# Patient Record
Sex: FEMALE | Race: WHITE | URBAN - METROPOLITAN AREA
[De-identification: names, ages, dates, MRNs, and addresses within clinical notes are randomized per-mention and may not be internally consistent; named-entity substitution may affect disease eponyms.]

---

## 2024-03-08 ENCOUNTER — OFFICE VISIT (OUTPATIENT)
Dept: INTERNAL MEDICINE CLINIC | Facility: CLINIC | Age: 44
End: 2024-03-08
Payer: COMMERCIAL

## 2024-03-08 VITALS
BODY MASS INDEX: 41.41 KG/M2 | WEIGHT: 225 LBS | HEIGHT: 62 IN | RESPIRATION RATE: 14 BRPM | OXYGEN SATURATION: 99 % | HEART RATE: 104 BPM | DIASTOLIC BLOOD PRESSURE: 86 MMHG | TEMPERATURE: 99 F | SYSTOLIC BLOOD PRESSURE: 126 MMHG

## 2024-03-08 DIAGNOSIS — H81.10 BENIGN PAROXYSMAL POSITIONAL VERTIGO, UNSPECIFIED LATERALITY: ICD-10-CM

## 2024-03-08 DIAGNOSIS — Z12.31 ENCOUNTER FOR SCREENING MAMMOGRAM FOR MALIGNANT NEOPLASM OF BREAST: ICD-10-CM

## 2024-03-08 DIAGNOSIS — Z13.220 SCREENING, LIPID: ICD-10-CM

## 2024-03-08 DIAGNOSIS — M54.50 ACUTE MIDLINE LOW BACK PAIN WITHOUT SCIATICA: ICD-10-CM

## 2024-03-08 DIAGNOSIS — R21 RASH: Primary | ICD-10-CM

## 2024-03-08 DIAGNOSIS — Z13.29 SCREENING FOR THYROID DISORDER: ICD-10-CM

## 2024-03-08 DIAGNOSIS — Z13.1 SCREENING FOR DIABETES MELLITUS (DM): ICD-10-CM

## 2024-03-08 DIAGNOSIS — Z12.11 SCREEN FOR COLON CANCER: ICD-10-CM

## 2024-03-08 DIAGNOSIS — Z13.0 SCREENING, ANEMIA, DEFICIENCY, IRON: ICD-10-CM

## 2024-03-08 PROCEDURE — 3008F BODY MASS INDEX DOCD: CPT | Performed by: PHYSICIAN ASSISTANT

## 2024-03-08 PROCEDURE — 99204 OFFICE O/P NEW MOD 45 MIN: CPT | Performed by: PHYSICIAN ASSISTANT

## 2024-03-08 PROCEDURE — 3079F DIAST BP 80-89 MM HG: CPT | Performed by: PHYSICIAN ASSISTANT

## 2024-03-08 PROCEDURE — 90715 TDAP VACCINE 7 YRS/> IM: CPT | Performed by: PHYSICIAN ASSISTANT

## 2024-03-08 PROCEDURE — 3074F SYST BP LT 130 MM HG: CPT | Performed by: PHYSICIAN ASSISTANT

## 2024-03-08 PROCEDURE — 90471 IMMUNIZATION ADMIN: CPT | Performed by: PHYSICIAN ASSISTANT

## 2024-03-08 RX ORDER — FLUTICASONE PROPIONATE 50 MCG
SPRAY, SUSPENSION (ML) NASAL
COMMUNITY
Start: 2024-02-26

## 2024-03-08 RX ORDER — DOXYCYCLINE 100 MG/1
CAPSULE ORAL
COMMUNITY
Start: 2024-02-28

## 2024-03-08 RX ORDER — YOHIMBE BARK 500 MG
CAPSULE ORAL
COMMUNITY

## 2024-03-08 RX ORDER — AZELASTINE HYDROCHLORIDE 137 UG/1
1 SPRAY, METERED NASAL DAILY
COMMUNITY
Start: 2024-02-28

## 2024-03-08 RX ORDER — CHOLECALCIFEROL (VITAMIN D3) 25 MCG
TABLET,CHEWABLE ORAL
COMMUNITY

## 2024-03-08 RX ORDER — METRONIDAZOLE 10 MG/G
GEL TOPICAL
COMMUNITY
Start: 2024-02-26

## 2024-03-08 RX ORDER — MULTIVIT-MIN/FOLIC ACID/BIOTIN 200-300MCG
TABLET,CHEWABLE ORAL
COMMUNITY

## 2024-03-08 NOTE — PROGRESS NOTES
Shelby Bran is a 43 year old female.   Chief Complaint   Patient presents with    New Patient     Establish care - Pt has had recent  bouts of vertigo in the past 2wks.  Pt also c/o lower back/hip pain     HPI:    Pt presents to establish care.     Rash to face since this morning. Rash extends onto neck and chest. +itchy.   She developed similar rash with pcn and sulfa in the past.   Currently taking doxycycline due to sinusitis. She has been on this x 9 days. Sinuses are feeling better.   Denies difficulty swallowing, difficulty breathing, lip swelling, facial swelling, tongue swelling, and sensation of throat closing.     Dizziness. Occurs when laying down and rolls to the right. Intermittent x 2 weeks.   Dizziness resolves quickly.     Low back pain x 1 month. Feels like grinding where spine and pelvis come together.  Seems to come and go randomly.   Denies injury/trauma.   Denies difficulty sleeping due to pain.   Ibuporofen prn with some relief  Denies urinary symptoms.   Denies bowel/bladder incontinence/habit changes.       Allergies:  Allergies   Allergen Reactions    Penicillins HIVES    Sulfa Antibiotics HIVES    Doxycycline RASH      Current Meds:  Current Outpatient Medications   Medication Sig Dispense Refill    Apple Cider Vinegar 600 MG Oral Cap       azelastine 137 MCG/SPRAY Nasal Solution 1 spray by Nasal route daily.      Cholecalciferol (VITAMIN D3 GUMMIES) 25 MCG (1000 UT) Oral Chew Tab       fluticasone propionate 50 MCG/ACT Nasal Suspension       Lactobacillus (ACIDOPHILUS) 100 MG Oral Cap       metroNIDAZOLE 1 % External Gel       Misc Natural Products (ELDERBERRY/VITAMIN C/ZINC) Oral Chew Tab       Multiple Vitamins-Minerals (WOMENS MULTI GUMMIES) Oral Chew Tab       Doxycycline Monohydrate 100 MG Oral Cap           PMH:     Past Medical History:   Diagnosis Date    Allergic rhinitis     Esophageal reflux     Not currently a problem    Essential hypertension     During pregnancy 13 years  ago    Obesity     Sleep apnea     Currently being evaluated for sleep apnea    Tendonitis of both wrists     Tinnitus aurium, right        ROS:   GENERAL: Negative for fever, chills and fatigue. NAD.  HENT: Negative for congestion, sore throat, and ear pain.  RESPIRATORY: Negative for cough, chest tightness, shortness of breath and wheezing.    CV: Negative for chest pain, palpitations and leg swelling.   GI: Negative for nausea, vomiting, abdominal pain, diarrhea, and blood in stool.   : Negative for dysuria, hematuria and difficulty urinating.   MUSCULOSKELETAL: Negative for myalgias, joint swelling, arthralgias and gait problem. +back pain   NEURO: Negative for syncope, weakness, numbness, tingling and headaches. +dizziness  PSYCH: The patient is not nervous/anxious. No depression.      PHYSICAL EXAM:    /86   Pulse 104   Temp 98.7 °F (37.1 °C) (Temporal)   Resp 14   Ht 5' 2\" (1.575 m)   Wt 225 lb (102.1 kg)   LMP 01/08/2024 (Exact Date)   SpO2 99%   BMI 41.15 kg/m²     GENERAL: NAD. A&Ox3  SKIN: erythematous macular rash to left side of face, anterior neck, and chest   EYES: PERRL, EOMI, sclera clear   HEENT: Ear canals clear, TMs pearly gray bilaterally. Throat without erythema or exudates.  NECK: No LAD.   RESPIRATORY: CTAB, no R/R/W  CV: RRR, no murmurs.   ABD: Soft, non-tender, non-distended. +bowel sounds. No rebound tenderness.   NEURO: No focal deficits.   MUSCULOSKELETAL: Full ROM of all extremities. No swelling. +left SI joint tenderness   PSYCH: Appropriate mood and affect.      ASSESSMENT/ PLAN:   1. Rash  Stop doxycycline (med added to allergy list)  Start zyrtec daily   If persists or worsens, then would add po steroid     2. Benign paroxysmal positional vertigo, unspecified laterality  Change positions slowly   Vestibular PT if persists     3. Acute midline low back pain without sciatica  Check xray   - XR LUMBAR SPINE (MIN 2 VIEWS) (CPT=72100); Future    4. Encounter for screening  mammogram for malignant neoplasm of breast  - [facility] EDNA 2D+3D SCREENING BILAT (CPT=77067/42321); Future    5. Screen for colon cancer  - Gastro Referral - In Network    6. Screening for diabetes mellitus (DM)  - Comp Metabolic Panel (14) [E]; Future    7. Screening, lipid  - Lipid Panel [E]; Future    8. Screening, anemia, deficiency, iron  - CBC W Differential W Platelet [E]; Future    9. Screening for thyroid disorder  - TSH W Reflex To Free T4 [E]; Future       Health Maintenance Due   Topic Date Due    Annual Physical  Never done    Colorectal Cancer Screening  Never done    Mammogram  Never done    DTaP,Tdap,and Td Vaccines (1 - Tdap) Never done    Pap Smear  Never done    COVID-19 Vaccine (1 - 2023-24 season) Never done    Influenza Vaccine (1) Never done    Annual Depression Screening  Never done           Pt indicates understanding and agrees to the plan.     Return in about 1 month (around 4/8/2024) for physical.    Jaja Pitts PA-C

## 2024-03-14 ENCOUNTER — LAB ENCOUNTER (OUTPATIENT)
Dept: LAB | Age: 44
End: 2024-03-14
Attending: PHYSICIAN ASSISTANT
Payer: COMMERCIAL

## 2024-03-14 DIAGNOSIS — Z13.0 SCREENING, ANEMIA, DEFICIENCY, IRON: ICD-10-CM

## 2024-03-14 DIAGNOSIS — R73.01 IFG (IMPAIRED FASTING GLUCOSE): ICD-10-CM

## 2024-03-14 DIAGNOSIS — Z13.29 SCREENING FOR THYROID DISORDER: ICD-10-CM

## 2024-03-14 DIAGNOSIS — Z13.220 SCREENING, LIPID: ICD-10-CM

## 2024-03-14 DIAGNOSIS — Z13.1 SCREENING FOR DIABETES MELLITUS (DM): ICD-10-CM

## 2024-03-14 LAB
ALBUMIN SERPL-MCNC: 3.8 G/DL (ref 3.4–5)
ALBUMIN/GLOB SERPL: 1 {RATIO} (ref 1–2)
ALP LIVER SERPL-CCNC: 63 U/L
ALT SERPL-CCNC: 27 U/L
ANION GAP SERPL CALC-SCNC: 6 MMOL/L (ref 0–18)
AST SERPL-CCNC: 23 U/L (ref 15–37)
BASOPHILS # BLD AUTO: 0.05 X10(3) UL (ref 0–0.2)
BASOPHILS NFR BLD AUTO: 0.7 %
BILIRUB SERPL-MCNC: 0.4 MG/DL (ref 0.1–2)
BUN BLD-MCNC: 15 MG/DL (ref 9–23)
CALCIUM BLD-MCNC: 9.3 MG/DL (ref 8.5–10.1)
CHLORIDE SERPL-SCNC: 108 MMOL/L (ref 98–112)
CHOLEST SERPL-MCNC: 172 MG/DL (ref ?–200)
CO2 SERPL-SCNC: 21 MMOL/L (ref 21–32)
CREAT BLD-MCNC: 0.75 MG/DL
EGFRCR SERPLBLD CKD-EPI 2021: 100 ML/MIN/1.73M2 (ref 60–?)
EOSINOPHIL # BLD AUTO: 0.31 X10(3) UL (ref 0–0.7)
EOSINOPHIL NFR BLD AUTO: 4.3 %
ERYTHROCYTE [DISTWIDTH] IN BLOOD BY AUTOMATED COUNT: 13.5 %
FASTING PATIENT LIPID ANSWER: YES
FASTING STATUS PATIENT QL REPORTED: YES
GLOBULIN PLAS-MCNC: 3.7 G/DL (ref 2.8–4.4)
GLUCOSE BLD-MCNC: 109 MG/DL (ref 70–99)
HCT VFR BLD AUTO: 40.1 %
HDLC SERPL-MCNC: 45 MG/DL (ref 40–59)
HGB BLD-MCNC: 13.7 G/DL
IMM GRANULOCYTES # BLD AUTO: 0.01 X10(3) UL (ref 0–1)
IMM GRANULOCYTES NFR BLD: 0.1 %
LDLC SERPL CALC-MCNC: 100 MG/DL (ref ?–100)
LYMPHOCYTES # BLD AUTO: 2.56 X10(3) UL (ref 1–4)
LYMPHOCYTES NFR BLD AUTO: 35.3 %
MCH RBC QN AUTO: 29.9 PG (ref 26–34)
MCHC RBC AUTO-ENTMCNC: 34.2 G/DL (ref 31–37)
MCV RBC AUTO: 87.6 FL
MONOCYTES # BLD AUTO: 0.39 X10(3) UL (ref 0.1–1)
MONOCYTES NFR BLD AUTO: 5.4 %
NEUTROPHILS # BLD AUTO: 3.94 X10 (3) UL (ref 1.5–7.7)
NEUTROPHILS # BLD AUTO: 3.94 X10(3) UL (ref 1.5–7.7)
NEUTROPHILS NFR BLD AUTO: 54.2 %
NONHDLC SERPL-MCNC: 127 MG/DL (ref ?–130)
OSMOLALITY SERPL CALC.SUM OF ELEC: 281 MOSM/KG (ref 275–295)
PLATELET # BLD AUTO: 303 10(3)UL (ref 150–450)
POTASSIUM SERPL-SCNC: 4.3 MMOL/L (ref 3.5–5.1)
PROT SERPL-MCNC: 7.5 G/DL (ref 6.4–8.2)
RBC # BLD AUTO: 4.58 X10(6)UL
SODIUM SERPL-SCNC: 135 MMOL/L (ref 136–145)
TRIGL SERPL-MCNC: 155 MG/DL (ref 30–149)
TSI SER-ACNC: 1.55 MIU/ML (ref 0.36–3.74)
VLDLC SERPL CALC-MCNC: 26 MG/DL (ref 0–30)
WBC # BLD AUTO: 7.3 X10(3) UL (ref 4–11)

## 2024-03-14 PROCEDURE — 36415 COLL VENOUS BLD VENIPUNCTURE: CPT

## 2024-03-14 PROCEDURE — 83036 HEMOGLOBIN GLYCOSYLATED A1C: CPT

## 2024-03-14 PROCEDURE — 85025 COMPLETE CBC W/AUTO DIFF WBC: CPT

## 2024-03-14 PROCEDURE — 80061 LIPID PANEL: CPT

## 2024-03-14 PROCEDURE — 80053 COMPREHEN METABOLIC PANEL: CPT

## 2024-03-14 PROCEDURE — 84443 ASSAY THYROID STIM HORMONE: CPT

## 2024-03-15 ENCOUNTER — HOSPITAL ENCOUNTER (OUTPATIENT)
Dept: GENERAL RADIOLOGY | Age: 44
Discharge: HOME | End: 2024-03-15
Attending: PHYSICIAN ASSISTANT
Payer: COMMERCIAL

## 2024-03-15 DIAGNOSIS — R73.01 IFG (IMPAIRED FASTING GLUCOSE): Primary | ICD-10-CM

## 2024-03-15 DIAGNOSIS — M54.50 ACUTE MIDLINE LOW BACK PAIN WITHOUT SCIATICA: ICD-10-CM

## 2024-03-15 LAB
EST. AVERAGE GLUCOSE BLD GHB EST-MCNC: 134 MG/DL (ref 68–126)
HBA1C MFR BLD: 6.3 % (ref ?–5.7)

## 2024-03-15 PROCEDURE — 72100 X-RAY EXAM L-S SPINE 2/3 VWS: CPT | Performed by: PHYSICIAN ASSISTANT

## 2024-04-09 ENCOUNTER — OFFICE VISIT (OUTPATIENT)
Dept: INTERNAL MEDICINE CLINIC | Facility: CLINIC | Age: 44
End: 2024-04-09
Payer: COMMERCIAL

## 2024-04-09 VITALS
BODY MASS INDEX: 41.96 KG/M2 | DIASTOLIC BLOOD PRESSURE: 76 MMHG | HEART RATE: 79 BPM | RESPIRATION RATE: 14 BRPM | HEIGHT: 62 IN | WEIGHT: 228 LBS | SYSTOLIC BLOOD PRESSURE: 134 MMHG | OXYGEN SATURATION: 97 %

## 2024-04-09 DIAGNOSIS — M54.50 ACUTE MIDLINE LOW BACK PAIN WITHOUT SCIATICA: ICD-10-CM

## 2024-04-09 DIAGNOSIS — Z12.4 SCREENING FOR CERVICAL CANCER: ICD-10-CM

## 2024-04-09 DIAGNOSIS — G47.33 OSA (OBSTRUCTIVE SLEEP APNEA): ICD-10-CM

## 2024-04-09 DIAGNOSIS — R73.03 PREDIABETES: ICD-10-CM

## 2024-04-09 DIAGNOSIS — Z00.00 ROUTINE GENERAL MEDICAL EXAMINATION AT A HEALTH CARE FACILITY: Primary | ICD-10-CM

## 2024-04-09 PROCEDURE — 3008F BODY MASS INDEX DOCD: CPT | Performed by: PHYSICIAN ASSISTANT

## 2024-04-09 PROCEDURE — 3078F DIAST BP <80 MM HG: CPT | Performed by: PHYSICIAN ASSISTANT

## 2024-04-09 PROCEDURE — 99396 PREV VISIT EST AGE 40-64: CPT | Performed by: PHYSICIAN ASSISTANT

## 2024-04-09 PROCEDURE — 87624 HPV HI-RISK TYP POOLED RSLT: CPT | Performed by: PHYSICIAN ASSISTANT

## 2024-04-09 PROCEDURE — 3075F SYST BP GE 130 - 139MM HG: CPT | Performed by: PHYSICIAN ASSISTANT

## 2024-04-09 NOTE — PROGRESS NOTES
Shelby Bran is a 43 year old female.    Chief Complaint   Patient presents with    Physical     Rm 2 kb       Patient complains of:    Prediabetes on recent labs. A1c 6.3%.     Back pain persists. No change since last visit.     Last pap was >5 years ago. She denies h/o abnormal pap     SABRINA. Diagnosed recently. Seeing Count includes the [facility] in [place]. She is waiting for her cpap.       Current Outpatient Medications on File Prior to Visit   Medication Sig Dispense Refill    Apple Cider Vinegar 600 MG Oral Cap       azelastine 137 MCG/SPRAY Nasal Solution 1 spray by Nasal route daily.      Cholecalciferol (VITAMIN D3 GUMMIES) 25 MCG (1000 UT) Oral Chew Tab       fluticasone propionate 50 MCG/ACT Nasal Suspension       Lactobacillus (ACIDOPHILUS) 100 MG Oral Cap       metroNIDAZOLE 1 % External Gel       Misc Natural Products (ELDERBERRY/VITAMIN C/ZINC) Oral Chew Tab       Multiple Vitamins-Minerals (WOMENS MULTI GUMMIES) Oral Chew Tab        No current facility-administered medications on file prior to visit.        Past Medical History:   Diagnosis Date    Allergic rhinitis     Esophageal reflux     Not currently a problem    Essential hypertension     During pregnancy 13 years ago    Obesity     Sleep apnea     Currently being evaluated for sleep apnea    Tendonitis of both wrists     Tinnitus aurium, right      Past Surgical History:   Procedure Laterality Date           Family History   Problem Relation Age of Onset    Hypertension Father     Cancer Father         Kidney cancer    Dementia Father         Early stage dementia    Cancer Maternal Grandmother         Vulvar cancer    Dementia Maternal Grandmother     Depression Mother        Social History     Socioeconomic History    Marital status:     Number of children: 1   Tobacco Use    Smoking status: Never    Smokeless tobacco: Never   Vaping Use    Vaping Use: Never used   Substance and Sexual Activity    Alcohol use: Yes     Alcohol/week:  1.0 standard drink of alcohol     Types: 1 Standard drinks or equivalent per week     Comment: 1 drink weekly    Drug use: Never   Other Topics Concern    Caffeine Concern No    Exercise No    Seat Belt Yes    Special Diet No    Stress Concern No    Weight Concern Yes         HEALTH MAINTENANCE:     Health Maintenance Due   Topic Date Due    Annual Physical  Never done    Colorectal Cancer Screening  Never done    Mammogram  Never done    Pap Smear  Never done    COVID-19 Vaccine (4 - 2023-24 season) 09/01/2023    Annual Depression Screening  Never done       -  -  -      REVIEW OF SYSTEMS:   GENERAL: feels well otherwise  SKIN: denies any unusual skin lesions  EYES:denies blurred vision or double vision  HEENT: denies nasal congestion, sinus pain or ST  LUNGS: denies shortness of breath with exertion  CARDIOVASCULAR: denies chest pain on exertion  GI: denies abdominal pain,denies heartburn  : denies dysuria, vaginal discharge or itching  MUSCULOSKELETAL: denies back pain  NEURO: denies headaches  PSYCHE: denies depression or anxiety  HEMATOLOGIC: denies hx of anemia  ENDOCRINE: denies thyroid history  ALL/ASTHMA: denies hx of allergy or asthma    EXAM:   /76   Pulse 79   Resp 14   Ht 5' 2\" (1.575 m)   Wt 228 lb (103.4 kg)   LMP  (LMP Unknown)   SpO2 97%   BMI 41.70 kg/m²   GENERAL: well developed, well nourished,in no apparent distress  SKIN: no rashes,no suspicious lesions  HEENT: atraumatic, normocephalic,ears and throat are clear  EYES:PERRLA, EOMI, conjunctiva are clear  NECK: supple,no adenopathy  CHEST: no chest tenderness  BREAST: no dominant or suspicious mass  LUNGS: clear to auscultation  CARDIO: RRR without murmur  GI: good BS's,no masses, HSM or tenderness  GYNE/:   EXTERNAL GENITALIA: normal, no lesions   VAGINA: normal, scant discharge   CERVIX: normal, no lesions, pap done with HR HPV   BIMANUAL: no cervical motion tenderness, no adnexal masses or tenderness   MUSCULOSKELETAL: back  is not tender,FROM of the back  EXTREMITIES: no cyanosis, clubbing or edema  NEURO: Oriented times three,cranial nerves are intact,motor and sensory are grossly intact    ASSESSMENT AND PLAN:   1. Routine general medical examination at a health care facility  Labs reviewed with pt   She is waiting for call back from [facility] so she can get colonoscopy scheduled - may need at the hospital due to SABRINA  Pap completed today   Mammogram is scheduled     2. Acute midline low back pain without sciatica  Recommend PT - referral placed   - Physical Therapy Referral - [facility]    3. Prediabetes  Encouraged low carb diet and increase exercise   Recheck in 3 months  - Hemoglobin A1C [E]; Future  - Basic Metabolic Panel (8) [E]; Future    4. Screening for cervical cancer  - ThinPrep PAP Smear; Future  - Hpv Dna  High Risk , Thin Prep Collect; Future  - ThinPrep PAP Smear  - Hpv Dna  High Risk , Thin Prep Collect    5. SABRINA (obstructive sleep apnea)  Per [employer]        Pt' s weight is   Wt Readings from Last 6 Encounters:   04/09/24 228 lb (103.4 kg)   03/08/24 225 lb (102.1 kg)   , Body mass index is 41.7 kg/m². recommended low fat diet and aerobic exercise 30 minutes three times weekly.     Encounter Diagnoses   Name Primary?    Routine general medical examination at a health care facility Yes    Acute midline low back pain without sciatica     Prediabetes     Screening for cervical cancer     SABRINA (obstructive sleep apnea)        Orders Placed This Encounter   Procedures    Hpv Dna  High Risk , Thin Prep Collect    Hemoglobin A1C [E]    Basic Metabolic Panel (8) [E]    ThinPrep PAP Smear       Meds and Refills:  Requested Prescriptions      No prescriptions requested or ordered in this encounter       Imaging and Referrals:  OP REFERRAL TO [facility]    The patient indicates understanding of these issues and agrees to the plan.  The patient is asked to return for CPX in one year.    Jaja Pitts,  SHERIN

## 2024-04-10 LAB — HPV I/H RISK 1 DNA SPEC QL NAA+PROBE: NEGATIVE

## 2024-04-16 LAB
.: NORMAL
.: NORMAL

## 2024-04-18 ENCOUNTER — HOSPITAL ENCOUNTER (OUTPATIENT)
Dept: MAMMOGRAPHY | Age: 44
Discharge: HOME | End: 2024-04-18
Attending: PHYSICIAN ASSISTANT
Payer: COMMERCIAL

## 2024-04-18 DIAGNOSIS — Z12.31 ENCOUNTER FOR SCREENING MAMMOGRAM FOR MALIGNANT NEOPLASM OF BREAST: ICD-10-CM

## 2024-04-18 PROCEDURE — 77063 BREAST TOMOSYNTHESIS BI: CPT | Performed by: PHYSICIAN ASSISTANT

## 2024-04-18 PROCEDURE — 77067 SCR MAMMO BI INCL CAD: CPT | Performed by: PHYSICIAN ASSISTANT

## 2024-04-19 PROBLEM — Z12.11 SPECIAL SCREENING FOR MALIGNANT NEOPLASM OF COLON: Status: ACTIVE | Noted: 2024-04-19

## 2024-04-19 PROBLEM — D12.2 BENIGN NEOPLASM OF ASCENDING COLON: Status: ACTIVE | Noted: 2024-04-19

## 2024-04-26 ENCOUNTER — TELEPHONE (OUTPATIENT)
Dept: INTERNAL MEDICINE CLINIC | Facility: CLINIC | Age: 44
End: 2024-04-26

## 2024-04-26 DIAGNOSIS — R92.8 ABNORMAL MAMMOGRAM: Primary | ICD-10-CM

## 2024-04-26 NOTE — TELEPHONE ENCOUNTER
Mammo completed 4/18/24     Impression   CONCLUSION:       BI-RADS CATEGORY:    DIAGNOSTIC CATEGORY 0--INCOMPLETE ASSESSMENT: NEED ADDITIONAL IMAGING EVALUATION.       RECOMMENDATIONS:    ADDITIONAL MAMMOGRAPHIC VIEWS REQUIRED--We will call the patient back for additional views and issue an addendum report. LEFT BREAST            Routing to CS

## 2024-04-26 NOTE — TELEPHONE ENCOUNTER
- Ask the center for a record of your blood pressure (or start keeping a record). If the first blood pressure is high (>140/90), I recommend you sit quietly for 5 minutes and then recheck. They can fax your record to [phone] ([facility]).   Orders placed.

## 2024-05-17 ENCOUNTER — HOSPITAL ENCOUNTER (OUTPATIENT)
Dept: MAMMOGRAPHY | Age: 44
Discharge: HOME | End: 2024-05-17
Attending: PHYSICIAN ASSISTANT

## 2024-05-17 ENCOUNTER — HOSPITAL ENCOUNTER (OUTPATIENT)
Dept: ULTRASOUND IMAGING | Age: 44
Discharge: HOME | End: 2024-05-17
Attending: PHYSICIAN ASSISTANT

## 2024-05-17 DIAGNOSIS — R92.8 ABNORMAL MAMMOGRAM: ICD-10-CM

## 2024-05-17 PROCEDURE — 76642 ULTRASOUND BREAST LIMITED: CPT | Performed by: PHYSICIAN ASSISTANT

## 2024-05-17 PROCEDURE — 77065 DX MAMMO INCL CAD UNI: CPT | Performed by: PHYSICIAN ASSISTANT

## 2024-05-17 PROCEDURE — 77061 BREAST TOMOSYNTHESIS UNI: CPT | Performed by: PHYSICIAN ASSISTANT

## 2024-06-28 ENCOUNTER — E-VISIT (OUTPATIENT)
Dept: TELEHEALTH | Age: 44
End: 2024-06-28
Payer: COMMERCIAL

## 2024-06-28 DIAGNOSIS — R39.9 UTI SYMPTOMS: Primary | ICD-10-CM

## 2024-06-30 NOTE — PROGRESS NOTES
Shelby Bran is a 43 year old female.  HPI:   See answers to questions above.     Current Outpatient Medications   Medication Sig Dispense Refill    Loratadine 5 MG Oral Chew Tab Chew 1 tablet (5 mg total) by mouth daily.      PEG 3350-KCl-Na Bicarb-NaCl 420 g Oral Recon Soln Take as directed by physician 4000 mL 0    Apple Cider Vinegar 600 MG Oral Cap       azelastine 137 MCG/SPRAY Nasal Solution 1 spray by Nasal route daily.      Cholecalciferol (VITAMIN D3 GUMMIES) 25 MCG (1000 UT) Oral Chew Tab       fluticasone propionate 50 MCG/ACT Nasal Suspension       Lactobacillus (ACIDOPHILUS) 100 MG Oral Cap       metroNIDAZOLE 1 % External Gel       Misc Natural Products (ELDERBERRY/VITAMIN C/ZINC) Oral Chew Tab       Multiple Vitamins-Minerals (WOMENS MULTI GUMMIES) Oral Chew Tab         Past Medical History:    Allergic rhinitis    Arthritis    spine    Body piercing    ears    Decorative tattoo    Diarrhea, unspecified    less frequent since switching to bottled water    Dizziness    paroxysmal positional vertigo    Esophageal reflux    Not currently a problem    Essential hypertension    During pregnancy 13 years ago    Headache disorder    occasionally    Hearing loss    very mild    Heartburn    occasionally    Heavy menses    Hemorrhoids    Obesity    Pain in joints    Sleep apnea    Currently being evaluated for sleep apnea    Tendonitis of both wrists    Tinnitus aurium, right    Wears glasses      Past Surgical History:   Procedure Laterality Date            Family History   Problem Relation Age of Onset    Hypertension Father     Cancer Father         Kidney cancer    Dementia Father         Early stage dementia    Cancer Maternal Grandmother         Vulvar cancer    Dementia Maternal Grandmother     Depression Mother       Social History:  Social History     Socioeconomic History    Marital status:     Number of children: 1   Tobacco Use    Smoking status: Never    Smokeless tobacco:  Never   Vaping Use    Vaping status: Never Used   Substance and Sexual Activity    Alcohol use: Yes     Alcohol/week: 1.0 standard drink of alcohol     Types: 1 Standard drinks or equivalent per week     Comment: 1 drink weekly    Drug use: Never   Other Topics Concern    Caffeine Concern No    Exercise No    Seat Belt Yes    Special Diet No    Stress Concern No    Weight Concern Yes         ASSESSMENT AND PLAN:     Encounter Diagnosis   Name Primary?    UTI symptoms Yes     Ordered U/A and Urine cx. If U/A suspicious for infection plan to send in Macrobid      Meds & Refills for this Visit:  Requested Prescriptions      No prescriptions requested or ordered in this encounter       Duration of  the service:  10 minutes    Patient advised to follow up with PCP if no improvement or worsening of symptoms  Refer to [employer] message for specific patient instructions

## 2024-07-01 ENCOUNTER — TELEPHONE (OUTPATIENT)
Dept: TELEHEALTH | Age: 44
End: 2024-07-01

## 2024-07-01 ENCOUNTER — LAB ENCOUNTER (OUTPATIENT)
Dept: LAB | Age: 44
End: 2024-07-01
Attending: PHYSICIAN ASSISTANT
Payer: COMMERCIAL

## 2024-07-01 DIAGNOSIS — R39.9 UTI SYMPTOMS: ICD-10-CM

## 2024-07-01 LAB
BILIRUB UR QL STRIP.AUTO: NEGATIVE
CLARITY UR REFRACT.AUTO: CLEAR
COLOR UR AUTO: YELLOW
GLUCOSE UR STRIP.AUTO-MCNC: NORMAL MG/DL
KETONES UR STRIP.AUTO-MCNC: NEGATIVE MG/DL
LEUKOCYTE ESTERASE UR QL STRIP.AUTO: 25
NITRITE UR QL STRIP.AUTO: NEGATIVE
PH UR STRIP.AUTO: 5.5 [PH] (ref 5–8)
PROT UR STRIP.AUTO-MCNC: NEGATIVE MG/DL
SP GR UR STRIP.AUTO: 1.02 (ref 1–1.03)
UROBILINOGEN UR STRIP.AUTO-MCNC: NORMAL MG/DL

## 2024-07-01 PROCEDURE — 87186 SC STD MICRODIL/AGAR DIL: CPT

## 2024-07-01 PROCEDURE — 87088 URINE BACTERIA CULTURE: CPT

## 2024-07-01 PROCEDURE — 87086 URINE CULTURE/COLONY COUNT: CPT

## 2024-07-01 PROCEDURE — 81001 URINALYSIS AUTO W/SCOPE: CPT

## 2024-07-01 RX ORDER — NITROFURANTOIN 25; 75 MG/1; MG/1
100 CAPSULE ORAL 2 TIMES DAILY
Qty: 14 CAPSULE | Refills: 0 | Status: SHIPPED | OUTPATIENT
Start: 2024-07-01 | End: 2024-07-08

## 2024-07-01 NOTE — TELEPHONE ENCOUNTER
Called patient to discuss UA results.  Macrobid sent to pharmacy.  IC/ER advised with back pain, abdominal pain, or any worsening symptoms.  Patient voices understanding.

## 2024-11-12 ENCOUNTER — TELEPHONE (OUTPATIENT)
Dept: INTERNAL MEDICINE CLINIC | Facility: CLINIC | Age: 44
End: 2024-11-12

## 2024-11-12 DIAGNOSIS — R92.8 ABNORMAL MAMMOGRAM: Primary | ICD-10-CM

## 2024-11-12 NOTE — TELEPHONE ENCOUNTER
Received call from pt. Patient called to schedule follow-up breast US as recommended following last mammogram, but they are needing order.     Mammo and L breast US completed 5/17/24:    Impression   CONCLUSION:  Recommend six-month follow-up left mammogram and left breast ultrasound to assess for stability of probably benign findings.     BI-RADS CATEGORY:    DIAGNOSTIC CATEGORY 3--PROBABLY BENIGN FINDING.       RECOMMENDATIONS:    SHORT TERM FOLLOW-UP DIAGNOSTIC MAMMOGRAM LEFT BREAST IN 6 MONTHS.       SHORT TERM FOLLOW-UP ULTRASOUND LEFT BREAST IN 6 MONTHS.      Pended.

## 2024-11-14 NOTE — TELEPHONE ENCOUNTER
Future Appointments   Date Time Provider Department Center   12/3/2024  9:00 AM [facility] RM1 [facility]

## 2024-12-03 ENCOUNTER — HOSPITAL ENCOUNTER (OUTPATIENT)
Dept: MAMMOGRAPHY | Age: 44
Discharge: HOME | End: 2024-12-03
Attending: PHYSICIAN ASSISTANT
Payer: COMMERCIAL

## 2024-12-03 ENCOUNTER — HOSPITAL ENCOUNTER (OUTPATIENT)
Dept: ULTRASOUND IMAGING | Age: 44
Discharge: HOME | End: 2024-12-03
Attending: PHYSICIAN ASSISTANT
Payer: COMMERCIAL

## 2024-12-03 DIAGNOSIS — R92.8 ABNORMAL MAMMOGRAM: ICD-10-CM

## 2024-12-03 PROCEDURE — 77061 BREAST TOMOSYNTHESIS UNI: CPT | Performed by: PHYSICIAN ASSISTANT

## 2024-12-03 PROCEDURE — 77065 DX MAMMO INCL CAD UNI: CPT | Performed by: PHYSICIAN ASSISTANT

## 2024-12-03 PROCEDURE — 76642 ULTRASOUND BREAST LIMITED: CPT | Performed by: PHYSICIAN ASSISTANT

## 2024-12-13 ENCOUNTER — OFFICE VISIT (OUTPATIENT)
Dept: INTERNAL MEDICINE CLINIC | Facility: CLINIC | Age: 44
End: 2024-12-13
Payer: COMMERCIAL

## 2024-12-13 VITALS
DIASTOLIC BLOOD PRESSURE: 80 MMHG | RESPIRATION RATE: 21 BRPM | TEMPERATURE: 98 F | HEART RATE: 117 BPM | HEIGHT: 62 IN | OXYGEN SATURATION: 98 % | BODY MASS INDEX: 43.43 KG/M2 | SYSTOLIC BLOOD PRESSURE: 132 MMHG | WEIGHT: 236 LBS

## 2024-12-13 DIAGNOSIS — M25.552 LEFT HIP PAIN: Primary | ICD-10-CM

## 2024-12-13 PROCEDURE — 3079F DIAST BP 80-89 MM HG: CPT | Performed by: INTERNAL MEDICINE

## 2024-12-13 PROCEDURE — 3075F SYST BP GE 130 - 139MM HG: CPT | Performed by: INTERNAL MEDICINE

## 2024-12-13 PROCEDURE — 3008F BODY MASS INDEX DOCD: CPT | Performed by: INTERNAL MEDICINE

## 2024-12-13 PROCEDURE — G2211 COMPLEX E/M VISIT ADD ON: HCPCS | Performed by: INTERNAL MEDICINE

## 2024-12-13 PROCEDURE — 99213 OFFICE O/P EST LOW 20 MIN: CPT | Performed by: INTERNAL MEDICINE

## 2024-12-13 RX ORDER — LORATADINE 10 MG/1
CAPSULE, LIQUID FILLED ORAL
COMMUNITY
Start: 2024-03-24

## 2024-12-13 NOTE — PROGRESS NOTES
Shelby Bran  9/1/1980    Chief Complaint   Patient presents with    Pain     Yb rm 14 Pain and warm to the touch  left leg      SUBJECTIVE   Shelby Bran is a 44 year old female who presents today for evaluation of  left leg pain.  Started in November.  The patient sleeps on her left side.  She tries to sleep on her right side and back.  She feels pain just distal to the left hip along the lateral aspect of the femur and past the knee.  When putting on her shoes she felt that her left leg was swollen.  She does not exercise regularly.    Review of Systems   Review of Systems   No f/c/chest pain or sob. No cough. No abd pain/n/v/d. No ha or dizziness. No numbness, tingling, or weakness.   OBJECTIVE:   /80   Pulse 117   Temp 98 °F (36.7 °C) (Temporal)   Resp 21   Ht 5' 2\" (1.575 m)   Wt 236 lb (107 kg)   LMP 11/07/2024 (Exact Date)   SpO2 98%   BMI 43.16 kg/m²   Physical Exam   Constitutional: Oriented to person, place, and time. No distress.   Cardiovascular: Normal rate, regular rhythm and intact distal pulses.  No murmur, rubs or gallops.   Pulmonary/Chest: Effort normal and breath sounds normal. No respiratory distress.  Abdominal: Soft. Bowel sounds are present. Non tender, no masses, no organomegaly or hernias.  Musculoskeletal: No tenderness along  spinous processes of cervical, thoracic or lumbar spine.  Full range of motion of the left hip.  There is tenderness just distal to the greater trochanter.  There is tenderness along the lateral aspect of the leg along the IT band.  There is also tenderness distal to the knee joint.  No pitting edema.  Skin: Skin is warm and dry. No rash on visualized skin.     Lab Results   Component Value Date     (H) 03/14/2024    BUN 15 03/14/2024    CREATSERUM 0.75 03/14/2024    ANIONGAP 6 03/14/2024    CA 9.3 03/14/2024     (L) 03/14/2024    K 4.3 03/14/2024     03/14/2024    CO2 21.0 03/14/2024    OSMOCALC 281 03/14/2024      Lab  Results   Component Value Date    WBC 7.3 03/14/2024    RBC 4.58 03/14/2024    HGB 13.7 03/14/2024    HCT 40.1 03/14/2024    MCV 87.6 03/14/2024    MCH 29.9 03/14/2024    MCHC 34.2 03/14/2024    RDW 13.5 03/14/2024    .0 03/14/2024      Lab Results   Component Value Date    TSH 1.550 03/14/2024        ASSESSMENT AND PLAN:       ICD-10-CM    1. Left hip pain  M25.552 Physical Therapy Referral - [facility]        Differential includes trochanteric bursitis versus IT band syndrome.  There is pain.  The greater trochanter traveling inferiorly past the knee joint.  The patient was instructed to apply ice and try NSAID for the next few weeks.  She was also given stretches for the hip and IT band.  Schedule physical therapy.  If no improvement then follow-up with me, can consider sports of this and referral.    TODAY'S ORDERS     No orders of the defined types were placed in this encounter.      Meds & Refills:  Requested Prescriptions      No prescriptions requested or ordered in this encounter       Imaging & Consults:  None    No follow-ups on file.  There are no Patient Instructions on file for this visit.    All questions were answered and the patient agrees with the plan.     Thank you,  Jena Rosario, DO

## 2025-04-29 ENCOUNTER — OFFICE VISIT (OUTPATIENT)
Dept: INTERNAL MEDICINE CLINIC | Facility: CLINIC | Age: 45
End: 2025-04-29
Payer: COMMERCIAL

## 2025-04-29 VITALS
BODY MASS INDEX: 43.06 KG/M2 | TEMPERATURE: 99 F | DIASTOLIC BLOOD PRESSURE: 78 MMHG | RESPIRATION RATE: 16 BRPM | SYSTOLIC BLOOD PRESSURE: 134 MMHG | OXYGEN SATURATION: 98 % | WEIGHT: 234 LBS | HEART RATE: 89 BPM | HEIGHT: 62 IN

## 2025-04-29 DIAGNOSIS — J01.90 ACUTE RHINOSINUSITIS: Primary | ICD-10-CM

## 2025-04-29 DIAGNOSIS — J30.2 SEASONAL ALLERGIC RHINITIS, UNSPECIFIED TRIGGER: ICD-10-CM

## 2025-04-29 PROCEDURE — 99213 OFFICE O/P EST LOW 20 MIN: CPT | Performed by: INTERNAL MEDICINE

## 2025-04-29 PROCEDURE — G2211 COMPLEX E/M VISIT ADD ON: HCPCS | Performed by: INTERNAL MEDICINE

## 2025-04-29 PROCEDURE — 3075F SYST BP GE 130 - 139MM HG: CPT | Performed by: INTERNAL MEDICINE

## 2025-04-29 PROCEDURE — 3078F DIAST BP <80 MM HG: CPT | Performed by: INTERNAL MEDICINE

## 2025-04-29 PROCEDURE — 3008F BODY MASS INDEX DOCD: CPT | Performed by: INTERNAL MEDICINE

## 2025-04-29 RX ORDER — FLUTICASONE PROPIONATE 50 MCG
1 SPRAY, SUSPENSION (ML) NASAL DAILY
Qty: 1 EACH | Refills: 1 | Status: SHIPPED | OUTPATIENT
Start: 2025-04-29

## 2025-04-29 RX ORDER — CEPHALEXIN 500 MG/1
500 CAPSULE ORAL 2 TIMES DAILY
Qty: 14 CAPSULE | Refills: 0 | Status: SHIPPED | OUTPATIENT
Start: 2025-04-29 | End: 2025-05-06

## 2025-04-29 NOTE — PROGRESS NOTES
The following individual(s) verbally consented to be recorded using ambient AI listening technology and understand that they can each withdraw their consent to this listening technology at any point by asking the clinician to turn off or pause the recording:    Patient name: Shelby Bran  Subjective:   Shelby Bran is a 44 year old female who presents for Sinus Problem (TA RM12 Possible sinus infection.)     History/Other:   History of Present Illness  Shelby Bran is a 44 year old female who presents with URI symptoms.    She has been experiencing congestion and sinus pressure for over a month, beginning around March 22, 2025. Initially, she had a cold that improved for about five days during a trip to [place], but symptoms returned towards the end of the trip. Since then, she has had persistent sinus pressure, especially when bending her head down, described as a burning sensation similar to getting water up her nose.    She has a history of a similar sinus infection last year, with symptoms resembling her current condition. She has been using Sudafed for congestion and takes Claritin daily for her seasonal allergies. She also performs sinus rinses once or twice daily since the onset of symptoms.    No fevers, chills, or sore throat. Her cough, which was present earlier, has mostly resolved. She reports a slight feeling of fullness in her ears, which has mostly subsided. Initially, she experienced some swelling of lymph nodes in the neck, but this has resolved.    Her seasonal allergies have been flaring up, with symptoms like itchy and watery eyes more prominent earlier in the month. She has been avoiding outdoor activities to manage her allergies.    She is allergic to penicillin and doxycycline, with a history of hives as a reaction. She recalls having taken cephalexin in the past without issues.   Chief Complaint Reviewed and Verified  Nursing Notes Reviewed and   Verified  Tobacco Reviewed   Allergies Reviewed  Medications Reviewed         Tobacco:  She has never smoked tobacco.    Current Medications[1]      Review of Systems:  Review of Systems  No f/c/chest pain or sob. No cough. No abd pain/n/v/d. No ha or dizziness. No numbness, tingling, or weakness.     Objective:   /78   Pulse 89   Temp 98.7 °F (37.1 °C)   Resp 16   Ht 5' 2\" (1.575 m)   Wt 234 lb (106.1 kg)   LMP 11/07/2024 (Exact Date)   SpO2 98%   BMI 42.80 kg/m²  Estimated body mass index is 42.8 kg/m² as calculated from the following:    Height as of this encounter: 5' 2\" (1.575 m).    Weight as of this encounter: 234 lb (106.1 kg).  Physical Exam  Constitutional: Oriented to person, place, and time. No distress.   HEENT:  Normocephalic and atraumatic.Tympanic membranes with mild effusions. Oropharynx is clear and moist, no visible exudates.  Eyes: Conjunctivae not injected.  Extraocular movements are intact  Neck: Full ROM.  Neck supple.  No thyromegaly  Cardiovascular: Normal rate, regular rhythm and intact distal pulses.  No murmur, rubs or gallops.   Pulmonary/Chest: Effort normal. No adventitious breath sounds. No respiratory distress.      Assessment & Plan:   There are no diagnoses linked to this encounter.  Assessment & Plan  Sinusitis    There is no fever, chills, or significant cough.  She is allergic to penicillin and Doxycycline but has tolerated cephalexin previously. Cephalexin is prescribed for 7 days due to past tolerance and effectiveness. Monitor for any rash or adverse reaction and report if it occurs. Continue sinus rinses.    Seasonal Allergies    Seasonal allergies caused a mild flare-up earlier this month, but symptoms are currently not severe. She experiences congestion and sinus pressure without itchy or watery eyes. She continues taking Claritin daily and uses sinus rinses regularly. Outdoor exposure is minimized to reduce allergy symptoms. Continue Claritin and sinus rinses. Prescribe Flonase for  allergy management.      No follow-ups on file.      Jena Rosario DO, 4/29/2025, 8:14 AM                [1]   Current Outpatient Medications   Medication Sig Dispense Refill    Loratadine (CLARITIN) 10 MG Oral Cap       Loratadine 5 MG Oral Chew Tab Chew 1 tablet (5 mg total) by mouth daily.      Apple Cider Vinegar 600 MG Oral Cap       Cholecalciferol (VITAMIN D3 GUMMIES) 25 MCG (1000 UT) Oral Chew Tab       fluticasone propionate 50 MCG/ACT Nasal Suspension       Lactobacillus (ACIDOPHILUS) 100 MG Oral Cap       Misc Natural Products (ELDERBERRY/VITAMIN C/ZINC) Oral Chew Tab       Multiple Vitamins-Minerals (WOMENS MULTI GUMMIES) Oral Chew Tab       PEG 3350-KCl-Na Bicarb-NaCl 420 g Oral Recon Soln Take as directed by physician 4000 mL 0    metroNIDAZOLE 1 % External Gel